# Patient Record
Sex: MALE | Race: WHITE | NOT HISPANIC OR LATINO
[De-identification: names, ages, dates, MRNs, and addresses within clinical notes are randomized per-mention and may not be internally consistent; named-entity substitution may affect disease eponyms.]

---
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
## 2020-10-12 ENCOUNTER — APPOINTMENT (OUTPATIENT)
Dept: PSYCHIATRY | Facility: CLINIC | Age: 20
End: 2020-10-12
Payer: COMMERCIAL

## 2020-10-12 DIAGNOSIS — F39 UNSPECIFIED MOOD [AFFECTIVE] DISORDER: ICD-10-CM

## 2020-10-12 PROCEDURE — 99205 OFFICE O/P NEW HI 60 MIN: CPT

## 2020-10-12 RX ORDER — CLONAZEPAM 0.5 MG/1
0.5 TABLET ORAL DAILY
Qty: 15 | Refills: 0 | Status: ACTIVE | COMMUNITY
Start: 2020-10-12 | End: 1900-01-01

## 2020-11-09 ENCOUNTER — APPOINTMENT (OUTPATIENT)
Dept: PSYCHIATRY | Facility: CLINIC | Age: 20
End: 2020-11-09
Payer: COMMERCIAL

## 2020-11-09 PROCEDURE — 99214 OFFICE O/P EST MOD 30 MIN: CPT

## 2020-11-09 PROCEDURE — 99072 ADDL SUPL MATRL&STAF TM PHE: CPT

## 2020-11-16 ENCOUNTER — APPOINTMENT (OUTPATIENT)
Dept: FAMILY MEDICINE | Facility: CLINIC | Age: 20
End: 2020-11-16
Payer: COMMERCIAL

## 2020-11-16 VITALS
TEMPERATURE: 99.5 F | BODY MASS INDEX: 24.59 KG/M2 | SYSTOLIC BLOOD PRESSURE: 130 MMHG | RESPIRATION RATE: 14 BRPM | WEIGHT: 166 LBS | HEART RATE: 90 BPM | HEIGHT: 69 IN | OXYGEN SATURATION: 98 % | DIASTOLIC BLOOD PRESSURE: 80 MMHG

## 2020-11-16 DIAGNOSIS — M54.2 CERVICALGIA: ICD-10-CM

## 2020-11-16 DIAGNOSIS — Z78.9 OTHER SPECIFIED HEALTH STATUS: ICD-10-CM

## 2020-11-16 DIAGNOSIS — Z23 ENCOUNTER FOR IMMUNIZATION: ICD-10-CM

## 2020-11-16 PROCEDURE — 99202 OFFICE O/P NEW SF 15 MIN: CPT | Mod: 25

## 2020-11-16 PROCEDURE — G0008: CPT

## 2020-11-16 PROCEDURE — 99072 ADDL SUPL MATRL&STAF TM PHE: CPT

## 2020-11-16 PROCEDURE — 90686 IIV4 VACC NO PRSV 0.5 ML IM: CPT

## 2020-12-01 ENCOUNTER — APPOINTMENT (OUTPATIENT)
Dept: PSYCHIATRY | Facility: CLINIC | Age: 20
End: 2020-12-01
Payer: COMMERCIAL

## 2020-12-01 PROCEDURE — 99072 ADDL SUPL MATRL&STAF TM PHE: CPT

## 2020-12-01 PROCEDURE — 99214 OFFICE O/P EST MOD 30 MIN: CPT

## 2020-12-01 RX ORDER — TIZANIDINE 2 MG/1
2 TABLET ORAL
Qty: 14 | Refills: 0 | Status: COMPLETED | COMMUNITY
Start: 2020-11-16 | End: 2020-12-01

## 2020-12-01 RX ORDER — FLUOXETINE HYDROCHLORIDE 10 MG/1
10 CAPSULE ORAL DAILY
Qty: 30 | Refills: 0 | Status: COMPLETED | COMMUNITY
Start: 2020-11-09 | End: 2020-12-01

## 2020-12-11 DIAGNOSIS — Z11.59 ENCOUNTER FOR SCREENING FOR OTHER VIRAL DISEASES: ICD-10-CM

## 2020-12-14 LAB — SARS-COV-2 N GENE NPH QL NAA+PROBE: DETECTED

## 2020-12-21 ENCOUNTER — APPOINTMENT (OUTPATIENT)
Dept: PSYCHIATRY | Facility: CLINIC | Age: 20
End: 2020-12-21

## 2021-01-05 ENCOUNTER — APPOINTMENT (OUTPATIENT)
Dept: PSYCHIATRY | Facility: CLINIC | Age: 21
End: 2021-01-05
Payer: COMMERCIAL

## 2021-01-05 PROCEDURE — 99072 ADDL SUPL MATRL&STAF TM PHE: CPT

## 2021-01-05 PROCEDURE — 99214 OFFICE O/P EST MOD 30 MIN: CPT

## 2021-01-05 RX ORDER — SERTRALINE 25 MG/1
25 TABLET, FILM COATED ORAL
Refills: 0 | Status: COMPLETED | COMMUNITY
End: 2021-01-05

## 2021-01-05 RX ORDER — FLUOXETINE HYDROCHLORIDE 20 MG/1
20 CAPSULE ORAL
Refills: 0 | Status: COMPLETED | COMMUNITY
End: 2021-01-05

## 2021-01-05 RX ORDER — NAPROXEN 500 MG/1
500 TABLET ORAL
Qty: 30 | Refills: 0 | Status: COMPLETED | COMMUNITY
Start: 2020-11-16 | End: 2021-01-05

## 2021-01-05 RX ORDER — CLINDAMYCIN PHOSPHATE 10 MG/ML
1 LOTION TOPICAL
Qty: 60 | Refills: 0 | Status: DISCONTINUED | COMMUNITY
Start: 2020-09-11

## 2021-01-05 RX ORDER — BUPROPION HYDROCHLORIDE 75 MG/1
75 TABLET, FILM COATED ORAL
Qty: 30 | Refills: 0 | Status: DISCONTINUED | COMMUNITY
Start: 2020-09-21

## 2021-01-14 ENCOUNTER — APPOINTMENT (OUTPATIENT)
Dept: PSYCHIATRY | Facility: CLINIC | Age: 21
End: 2021-01-14

## 2021-02-02 ENCOUNTER — APPOINTMENT (OUTPATIENT)
Dept: PSYCHIATRY | Facility: CLINIC | Age: 21
End: 2021-02-02
Payer: COMMERCIAL

## 2021-02-02 PROCEDURE — 99214 OFFICE O/P EST MOD 30 MIN: CPT | Mod: 95

## 2021-02-02 RX ORDER — ESCITALOPRAM OXALATE 5 MG/1
5 TABLET ORAL DAILY
Qty: 30 | Refills: 0 | Status: COMPLETED | COMMUNITY
Start: 2021-01-05 | End: 2021-02-02

## 2021-03-11 ENCOUNTER — NON-APPOINTMENT (OUTPATIENT)
Age: 21
End: 2021-03-11

## 2021-03-16 ENCOUNTER — APPOINTMENT (OUTPATIENT)
Dept: FAMILY MEDICINE | Facility: CLINIC | Age: 21
End: 2021-03-16
Payer: COMMERCIAL

## 2021-03-16 VITALS
DIASTOLIC BLOOD PRESSURE: 64 MMHG | HEIGHT: 70 IN | TEMPERATURE: 97.8 F | BODY MASS INDEX: 24.48 KG/M2 | HEART RATE: 87 BPM | OXYGEN SATURATION: 98 % | SYSTOLIC BLOOD PRESSURE: 116 MMHG | WEIGHT: 171 LBS | RESPIRATION RATE: 18 BRPM

## 2021-03-16 DIAGNOSIS — Z01.818 ENCOUNTER FOR OTHER PREPROCEDURAL EXAMINATION: ICD-10-CM

## 2021-03-16 DIAGNOSIS — Z82.49 FAMILY HISTORY OF ISCHEMIC HEART DISEASE AND OTHER DISEASES OF THE CIRCULATORY SYSTEM: ICD-10-CM

## 2021-03-16 DIAGNOSIS — Z80.8 FAMILY HISTORY OF MALIGNANT NEOPLASM OF OTHER ORGANS OR SYSTEMS: ICD-10-CM

## 2021-03-16 DIAGNOSIS — Z80.3 FAMILY HISTORY OF MALIGNANT NEOPLASM OF BREAST: ICD-10-CM

## 2021-03-16 DIAGNOSIS — Z80.51 FAMILY HISTORY OF MALIGNANT NEOPLASM OF KIDNEY: ICD-10-CM

## 2021-03-16 PROCEDURE — 99214 OFFICE O/P EST MOD 30 MIN: CPT | Mod: 25

## 2021-03-16 PROCEDURE — 36415 COLL VENOUS BLD VENIPUNCTURE: CPT

## 2021-03-16 PROCEDURE — 99072 ADDL SUPL MATRL&STAF TM PHE: CPT

## 2021-03-17 LAB
ALBUMIN SERPL ELPH-MCNC: 5 G/DL
ALP BLD-CCNC: 75 U/L
ALT SERPL-CCNC: 30 U/L
ANION GAP SERPL CALC-SCNC: 12 MMOL/L
AST SERPL-CCNC: 28 U/L
BASOPHILS # BLD AUTO: 0.05 K/UL
BASOPHILS NFR BLD AUTO: 0.7 %
BILIRUB SERPL-MCNC: 0.2 MG/DL
BUN SERPL-MCNC: 14 MG/DL
CALCIUM SERPL-MCNC: 9.9 MG/DL
CHLORIDE SERPL-SCNC: 101 MMOL/L
CO2 SERPL-SCNC: 26 MMOL/L
CREAT SERPL-MCNC: 0.96 MG/DL
EOSINOPHIL # BLD AUTO: 0.18 K/UL
EOSINOPHIL NFR BLD AUTO: 2.5 %
GLUCOSE SERPL-MCNC: 90 MG/DL
HCT VFR BLD CALC: 39.9 %
HGB BLD-MCNC: 13.6 G/DL
IMM GRANULOCYTES NFR BLD AUTO: 0.4 %
INR PPP: 0.99 RATIO
LYMPHOCYTES # BLD AUTO: 1.98 K/UL
LYMPHOCYTES NFR BLD AUTO: 28 %
MAN DIFF?: NORMAL
MCHC RBC-ENTMCNC: 29.5 PG
MCHC RBC-ENTMCNC: 34.1 GM/DL
MCV RBC AUTO: 86.6 FL
MONOCYTES # BLD AUTO: 0.58 K/UL
MONOCYTES NFR BLD AUTO: 8.2 %
NEUTROPHILS # BLD AUTO: 4.24 K/UL
NEUTROPHILS NFR BLD AUTO: 60.2 %
PLATELET # BLD AUTO: 351 K/UL
POTASSIUM SERPL-SCNC: 4.8 MMOL/L
PROT SERPL-MCNC: 6.9 G/DL
PT BLD: 11.8 SEC
RBC # BLD: 4.61 M/UL
RBC # FLD: 12.2 %
SODIUM SERPL-SCNC: 138 MMOL/L
WBC # FLD AUTO: 7.06 K/UL

## 2021-03-18 NOTE — PHYSICAL EXAM
[Normal TMs] : both tympanic membranes were normal [Normal Supraclavicular Nodes] : no supraclavicular lymphadenopathy [Coordination Grossly Intact] : coordination grossly intact [No Focal Deficits] : no focal deficits [Normal Gait] : normal gait [Speech Grossly Normal] : speech grossly normal [Alert and Oriented x3] : oriented to person, place, and time [Normal Mood] : the mood was normal [Normal] : affect was normal and insight and judgment were intact

## 2021-03-20 ENCOUNTER — APPOINTMENT (OUTPATIENT)
Dept: RADIOLOGY | Facility: CLINIC | Age: 21
End: 2021-03-20
Payer: COMMERCIAL

## 2021-03-20 ENCOUNTER — OUTPATIENT (OUTPATIENT)
Dept: OUTPATIENT SERVICES | Facility: HOSPITAL | Age: 21
LOS: 1 days | End: 2021-03-20
Payer: COMMERCIAL

## 2021-03-20 DIAGNOSIS — Z01.818 ENCOUNTER FOR OTHER PREPROCEDURAL EXAMINATION: ICD-10-CM

## 2021-03-20 PROCEDURE — 71046 X-RAY EXAM CHEST 2 VIEWS: CPT | Mod: 26

## 2021-03-20 PROCEDURE — 71046 X-RAY EXAM CHEST 2 VIEWS: CPT

## 2021-03-23 PROBLEM — Z01.818 PRE-OPERATIVE CLEARANCE: Status: ACTIVE | Noted: 2021-03-16

## 2021-03-23 NOTE — ASSESSMENT
[Other: _____] : [unfilled] [Patient Optimized for Surgery] : Patient optimized for surgery [FreeTextEntry4] : Patient optimized for surgery\par - Pending COVID pcr nasal swab [FreeTextEntry2] : Patient instructed to d/c all OTC medications/supplements one week prior to surgery

## 2021-03-23 NOTE — HISTORY OF PRESENT ILLNESS
[No Pertinent Cardiac History] : no history of aortic stenosis, atrial fibrillation, coronary artery disease, recent myocardial infarction, or implantable device/pacemaker [Smoker] : smoker [No Adverse Anesthesia Reaction] : no adverse anesthesia reaction in self or family member [(Patient denies any chest pain, claudication, dyspnea on exertion, orthopnea, palpitations or syncope)] : Patient denies any chest pain, claudication, dyspnea on exertion, orthopnea, palpitations or syncope [Asthma] : no asthma [COPD] : no COPD [Sleep Apnea] : no sleep apnea [Chronic Anticoagulation] : no chronic anticoagulation [Chronic Kidney Disease] : no chronic kidney disease [Diabetes] : no diabetes [FreeTextEntry1] :  nasal reconstruction/cosmetic rhinoplasty [FreeTextEntry2] : 03/25/21 [FreeTextEntry3] : MD Jose Thomas [FreeTextEntry4] : 21 yo male presents to the office for pre operative clearance for nasal reconstruction/cosmetic rhinoplasty.

## 2021-05-07 ENCOUNTER — APPOINTMENT (OUTPATIENT)
Dept: PSYCHIATRY | Facility: CLINIC | Age: 21
End: 2021-05-07
Payer: COMMERCIAL

## 2021-05-07 PROCEDURE — 99072 ADDL SUPL MATRL&STAF TM PHE: CPT

## 2021-05-07 PROCEDURE — 99214 OFFICE O/P EST MOD 30 MIN: CPT

## 2021-06-08 ENCOUNTER — APPOINTMENT (OUTPATIENT)
Dept: PSYCHIATRY | Facility: CLINIC | Age: 21
End: 2021-06-08

## 2021-07-02 ENCOUNTER — APPOINTMENT (OUTPATIENT)
Dept: DERMATOLOGY | Facility: CLINIC | Age: 21
End: 2021-07-02

## 2021-09-21 ENCOUNTER — APPOINTMENT (OUTPATIENT)
Dept: PSYCHIATRY | Facility: CLINIC | Age: 21
End: 2021-09-21

## 2021-09-22 ENCOUNTER — APPOINTMENT (OUTPATIENT)
Dept: PSYCHIATRY | Facility: CLINIC | Age: 21
End: 2021-09-22
Payer: COMMERCIAL

## 2021-09-22 PROCEDURE — 99214 OFFICE O/P EST MOD 30 MIN: CPT

## 2021-10-12 ENCOUNTER — APPOINTMENT (OUTPATIENT)
Dept: PAIN MANAGEMENT | Facility: CLINIC | Age: 21
End: 2021-10-12

## 2021-10-20 ENCOUNTER — APPOINTMENT (OUTPATIENT)
Age: 21
End: 2021-10-20

## 2021-11-02 ENCOUNTER — APPOINTMENT (OUTPATIENT)
Dept: PSYCHIATRY | Facility: CLINIC | Age: 21
End: 2021-11-02

## 2021-12-06 ENCOUNTER — APPOINTMENT (OUTPATIENT)
Dept: PSYCHIATRY | Facility: CLINIC | Age: 21
End: 2021-12-06
Payer: COMMERCIAL

## 2021-12-06 PROCEDURE — 99214 OFFICE O/P EST MOD 30 MIN: CPT

## 2021-12-08 LAB
25(OH)D3 SERPL-MCNC: 35 NG/ML
ALBUMIN SERPL ELPH-MCNC: 5.1 G/DL
ALP BLD-CCNC: 69 U/L
ALT SERPL-CCNC: 13 U/L
ANION GAP SERPL CALC-SCNC: 11 MMOL/L
AST SERPL-CCNC: 18 U/L
BASOPHILS # BLD AUTO: 0.03 K/UL
BASOPHILS NFR BLD AUTO: 0.5 %
BILIRUB SERPL-MCNC: 0.3 MG/DL
BUN SERPL-MCNC: 12 MG/DL
CALCIUM SERPL-MCNC: 10.1 MG/DL
CHLORIDE SERPL-SCNC: 102 MMOL/L
CHOLEST SERPL-MCNC: 163 MG/DL
CO2 SERPL-SCNC: 27 MMOL/L
CREAT SERPL-MCNC: 1.07 MG/DL
EOSINOPHIL # BLD AUTO: 0.08 K/UL
EOSINOPHIL NFR BLD AUTO: 1.4 %
ESTIMATED AVERAGE GLUCOSE: 108 MG/DL
GLUCOSE SERPL-MCNC: 95 MG/DL
HBA1C MFR BLD HPLC: 5.4 %
HCT VFR BLD CALC: 39.1 %
HDLC SERPL-MCNC: 49 MG/DL
HGB BLD-MCNC: 13.1 G/DL
IMM GRANULOCYTES NFR BLD AUTO: 0.2 %
LDLC SERPL CALC-MCNC: 100 MG/DL
LYMPHOCYTES # BLD AUTO: 1.55 K/UL
LYMPHOCYTES NFR BLD AUTO: 27.7 %
MAN DIFF?: NORMAL
MCHC RBC-ENTMCNC: 29.6 PG
MCHC RBC-ENTMCNC: 33.5 GM/DL
MCV RBC AUTO: 88.3 FL
MONOCYTES # BLD AUTO: 0.5 K/UL
MONOCYTES NFR BLD AUTO: 8.9 %
NEUTROPHILS # BLD AUTO: 3.43 K/UL
NEUTROPHILS NFR BLD AUTO: 61.3 %
NONHDLC SERPL-MCNC: 114 MG/DL
PLATELET # BLD AUTO: 318 K/UL
POTASSIUM SERPL-SCNC: 4.6 MMOL/L
PROT SERPL-MCNC: 7.3 G/DL
RBC # BLD: 4.43 M/UL
RBC # FLD: 12.2 %
SODIUM SERPL-SCNC: 141 MMOL/L
TRIGL SERPL-MCNC: 73 MG/DL
TSH SERPL-ACNC: 1.39 UIU/ML
WBC # FLD AUTO: 5.6 K/UL

## 2022-01-03 ENCOUNTER — APPOINTMENT (OUTPATIENT)
Dept: PSYCHIATRY | Facility: CLINIC | Age: 22
End: 2022-01-03
Payer: COMMERCIAL

## 2022-01-03 PROCEDURE — 99214 OFFICE O/P EST MOD 30 MIN: CPT

## 2022-01-03 RX ORDER — BUPROPION HYDROCHLORIDE 75 MG/1
75 TABLET, FILM COATED ORAL
Qty: 30 | Refills: 0 | Status: COMPLETED | COMMUNITY
Start: 2021-05-07 | End: 2022-01-03

## 2022-02-11 ENCOUNTER — APPOINTMENT (OUTPATIENT)
Dept: PSYCHIATRY | Facility: CLINIC | Age: 22
End: 2022-02-11
Payer: COMMERCIAL

## 2022-02-11 PROCEDURE — 99214 OFFICE O/P EST MOD 30 MIN: CPT

## 2022-03-11 ENCOUNTER — APPOINTMENT (OUTPATIENT)
Dept: PAIN MANAGEMENT | Facility: CLINIC | Age: 22
End: 2022-03-11

## 2022-03-14 ENCOUNTER — APPOINTMENT (OUTPATIENT)
Dept: PSYCHIATRY | Facility: CLINIC | Age: 22
End: 2022-03-14
Payer: COMMERCIAL

## 2022-03-14 PROCEDURE — 99214 OFFICE O/P EST MOD 30 MIN: CPT

## 2022-03-14 RX ORDER — BUPROPION HYDROCHLORIDE 150 MG/1
150 TABLET, EXTENDED RELEASE ORAL
Qty: 4 | Refills: 0 | Status: DISCONTINUED | COMMUNITY
Start: 2022-02-07

## 2022-04-11 PROBLEM — Z11.59 SCREENING FOR VIRAL DISEASE: Status: ACTIVE | Noted: 2020-12-11

## 2022-06-13 ENCOUNTER — APPOINTMENT (OUTPATIENT)
Dept: PSYCHIATRY | Facility: CLINIC | Age: 22
End: 2022-06-13
Payer: COMMERCIAL

## 2022-06-13 PROCEDURE — 99214 OFFICE O/P EST MOD 30 MIN: CPT

## 2022-08-25 ENCOUNTER — NON-APPOINTMENT (OUTPATIENT)
Age: 22
End: 2022-08-25

## 2022-08-26 ENCOUNTER — APPOINTMENT (OUTPATIENT)
Dept: FAMILY MEDICINE | Facility: CLINIC | Age: 22
End: 2022-08-26

## 2022-08-26 VITALS
DIASTOLIC BLOOD PRESSURE: 70 MMHG | HEIGHT: 70 IN | SYSTOLIC BLOOD PRESSURE: 122 MMHG | HEART RATE: 61 BPM | TEMPERATURE: 98.1 F | RESPIRATION RATE: 16 BRPM | WEIGHT: 176 LBS | OXYGEN SATURATION: 98 % | BODY MASS INDEX: 25.2 KG/M2

## 2022-08-26 DIAGNOSIS — Z00.00 ENCOUNTER FOR GENERAL ADULT MEDICAL EXAMINATION W/OUT ABNORMAL FINDINGS: ICD-10-CM

## 2022-08-26 PROCEDURE — 99395 PREV VISIT EST AGE 18-39: CPT | Mod: 25

## 2022-08-26 PROCEDURE — G0444 DEPRESSION SCREEN ANNUAL: CPT | Mod: 59

## 2022-08-30 PROBLEM — Z00.00 ENCOUNTER FOR PREVENTIVE HEALTH EXAMINATION: Status: ACTIVE | Noted: 2022-08-26

## 2022-08-30 NOTE — HEALTH RISK ASSESSMENT
[Very Good] : ~his/her~  mood as very good [Current] : Current [5-9] : 5-9 [2 - 4 times a month (2 pts)] : 2-4 times a month (2 points) [No] : In the past 12 months have you used drugs other than those required for medical reasons? No [With Family] : lives with family [# of Members in Household ___] :  household currently consist of [unfilled] member(s) [Student] : student [College] : College [Single] : single [Sexually Active] : sexually active [Feels Safe at Home] : Feels safe at home [Fully functional (bathing, dressing, toileting, transferring, walking, feeding)] : Fully functional (bathing, dressing, toileting, transferring, walking, feeding) [Fully functional (using the telephone, shopping, preparing meals, housekeeping, doing laundry, using] : Fully functional and needs no help or supervision to perform IADLs (using the telephone, shopping, preparing meals, housekeeping, doing laundry, using transportation, managing medications and managing finances) [Yes] : Yes [No falls in past year] : Patient reported no falls in the past year [0] : 2) Feeling down, depressed, or hopeless: Not at all (0) [PHQ-2 Negative - No further assessment needed] : PHQ-2 Negative - No further assessment needed [Audit-CScore] : 2 [de-identified] : active [de-identified] : tries to be healthy [[id number]] : 0 [High Risk Behavior] : no high risk behavior [Reports changes in hearing] : Reports no changes in hearing [Reports changes in vision] : Reports no changes in vision [de-identified] : st [facility]

## 2022-08-30 NOTE — REVIEW OF SYSTEMS
Add chlorthalidone 12.5 mg daily.  
Advised contact his cardiologist about whether his echo should be done now.  
Consider trial of newer statin.  
Has to Stop rosuvastatin intermittently due to myalgias.  On 5 mg daily.  Check FLP.  
See \"hyperlipidemia\"  
[Negative] : Heme/Lymph

## 2022-08-30 NOTE — HISTORY OF PRESENT ILLNESS
[FreeTextEntry1] : 21 yo male with PMHx anxiety/depression (bupropion, escitalopram) insomnia (seroquel) presents to the office for a physical.  [de-identified] : the patient reports feeling well, states having regular f/u appointments with psychiatrist, feels well on current medications. is currently attending [employer] for undergraduate degree in computer science.

## 2022-08-30 NOTE — PHYSICAL EXAM
[Normal Rate] : normal rate  [Regular Rhythm] : with a regular rhythm [Normal S1, S2] : normal S1 and S2 [No Rash] : no rash [Coordination Grossly Intact] : coordination grossly intact [No Focal Deficits] : no focal deficits [Normal Gait] : normal gait [Speech Grossly Normal] : speech grossly normal [Alert and Oriented x3] : oriented to person, place, and time [Normal Mood] : the mood was normal [Normal] : affect was normal and insight and judgment were intact

## 2022-08-30 NOTE — END OF VISIT
[FreeTextEntry3] : I, Dr. Sheldon, personally performed the evaluation and management (E/M) services for this established patient who presents today with (a) new problem(s)/exacerbation of (an) existing condition(s). That E/M includes conducting the examination, assessing all new/exacerbated conditions, and establishing a new plan of care. Today, my nurse practitioner, Sadia Macias, was here to observe my evaluation and management services fort his new problem/exacerbated condition to be follow going forward.\par

## 2022-09-02 ENCOUNTER — MED ADMIN CHARGE (OUTPATIENT)
Age: 22
End: 2022-09-02

## 2022-09-02 ENCOUNTER — APPOINTMENT (OUTPATIENT)
Dept: FAMILY MEDICINE | Facility: CLINIC | Age: 22
End: 2022-09-02

## 2022-09-02 DIAGNOSIS — Z23 ENCOUNTER FOR IMMUNIZATION: ICD-10-CM

## 2022-09-02 PROCEDURE — 90471 IMMUNIZATION ADMIN: CPT

## 2022-09-02 PROCEDURE — 90715 TDAP VACCINE 7 YRS/> IM: CPT

## 2022-09-07 ENCOUNTER — APPOINTMENT (OUTPATIENT)
Dept: PSYCHIATRY | Facility: CLINIC | Age: 22
End: 2022-09-07

## 2022-09-14 ENCOUNTER — NON-APPOINTMENT (OUTPATIENT)
Age: 22
End: 2022-09-14

## 2022-11-09 ENCOUNTER — APPOINTMENT (OUTPATIENT)
Dept: PSYCHIATRY | Facility: CLINIC | Age: 22
End: 2022-11-09

## 2022-11-09 PROCEDURE — 99214 OFFICE O/P EST MOD 30 MIN: CPT

## 2022-11-09 NOTE — CURRENT PSYCHIATRIC SYMPTOMS
[Depressed Mood] : depressed mood [Anhedonia] : anhedonia [Guilt] : feeling guilty [Decreased Concentration] : decreased concentrating ability [Insomnia] : insomnia [Psychomotor Agitation] : psychomotor agitation [Anorexia] : anorexia [Highly Irritable] : high irritability [Distractibility] : distractibility [Excessive Worry] : excessive worry [Ruminations] : ruminations [Hypochondriasis] : hypochondriasis [Panic] : panic  [de-identified] : denies [de-identified] : denies [de-identified] : denies [de-identified] : denies

## 2022-11-09 NOTE — HISTORY OF PRESENT ILLNESS
[de-identified] : Patient is here in the office for face to face interview with writer for 3 month followup visit.\par \par Writer has not seen the patient since June 2022. No medication changes were made at that time. Patients states he is in school. Doing well. States he likes the medication. Less depression and anxiety. Sleeping from midnight till 10 or 11 am with the Seroquel 25 mg. "I can't sleep without it."  Appetite is good. Has not gained or lost. Energy, concentration and motivation are all improved. Denies any AVH, SI or HI. Mother is a PA. Last drink was over the weekend where he had 2 drinks. No Klonopin this month.  [de-identified] : Patient is here for in the office for face to face interview for initial psychiatric evaluation. \par \par ID: Patient is a 19 yo  male, ashlee at [employer], unemployed, domicile living with father and siblings seen today for psychiatric evaluation and medication management.\par \par HPI: Patient is accompanied by his father. States he has been feeling depressed and anxious for 1.5 mths. Stressors contributing to his depression and anxiety is that he felt out of place at his college e so and then came back home. States he suffers from social anxiety. “Increase social anxiety, I feel like life isn’t real; mind racing all the time; I don’t feel like myself. Mind is blank in social situations.” \par \par Depression: has low mood and anhedonia every day. \par \par Anxiety: Endorses to anxiety in the form of worrying, rumination, panic attack: (last attack was 3 days ago; “My heart races, I sweat. It occurs 3-4 times per week).” Somatization (diarrhea, fatigue, and diarrhea, clenching of jaw, headache). \par \par Sleep: is decreased. States he sleeps at 3 am as his “mind will not shut off.” \par \par Appetite, energy, concentration and motivation are all decreased. Patient denies any AVH, Hl. States he has some passive SI but denies any plan. \par \par Hypomanic symptoms: irritability, distractibility. \par \par Substance use hx:\par Juul: 1 cartridge lasts him 1 week\par Alcohol: started at 14. Highest amt drinking 8-9 twelve oz beers. Last drink was last week ½ beer with father during basketball game. +REKHA, denies any DWI, or W/D symptoms from alcohol. \par Marijuana: started at 17 gave him increase anxiety and paranoia. Last use was 2 years ago. \par Caffeine: drinks 1 cup of coffee per day\par

## 2022-11-09 NOTE — PHYSICAL EXAM
[None] : none [Anxious] : anxious [Constricted] : constricted [Normal] : good [FreeTextEntry8] : "I'm depressed."

## 2022-11-09 NOTE — SOCIAL HISTORY
[With Family] : lives with family [Unemployed] : unemployed [Never ] : never  [High School] : high school [None] : none [FreeTextEntry4] : [employer] in college right now [FreeTextEntry1] : Born: Born in [place] \par Siblings: 3 siblings (1 sister 22 and 2 brothers 18 and 15)\par Parents:  when patient was 8 years old.  \par Education: [employer] in [employer]. Initially went to [employer] for college in Freshman year then [employer] for sophomore year and has been to [employer] for 1 week. Dropped 2 classes. Did not specify why changing schools or why dropped his classes. States it was related to anxiety. \par Occupation: unemployed \par /Kids single\par Abuse Denies\par Legal: Denies\par

## 2022-11-09 NOTE — FAMILY HISTORY
[FreeTextEntry1] : Mother: Bipolar\par \par Mother's side (maternal grandmother and maternal sister): MDD with psychosis; and Substance use\par \par Paternal nephew: unknown mental illness; used substance\par \par \par

## 2022-11-09 NOTE — REASON FOR VISIT
[Evaluation] : evaluation of [Parent] : parent [FreeTextEntry1] : anxiety, depression, and insomnia, and mind racing

## 2022-11-09 NOTE — DISCUSSION/SUMMARY
[FreeTextEntry1] : Assessment: Patient is a 21 yo  male with h/o anxiety, depression, mood disorder, insomnia, and panic attacks seen today for medication management. Patient is compliant with her medication, tolerating them well without any side effects. I-stop reviewed and no issues noticed.\par \par I-STOP:\par 10/12/2020 10/12/2020 clonazepam 0.5 mg tablet  15 15 Shashank Scott MD \par \par PLAN:\par Told patient to make appt to see Yg as he needs a therapist. \par Continue Wellbutrin  mg PO QAM for depression, low motivation, energy, concentration and decrease SSRI induced sexual dysfunction.\par Continue Lexapro 20 mg. PO QAM for depression and anxiety.   \par Continue Seroquel 25 mg PO QHS for mood stability, insomnia and increase appetite.  \par Continue Klonopin 0.5 mg PO PRN for panic attacks. # 10/15. no refills given\par - Discussed risks and benefits of medications including side effects of GI and sexual side effects with SSRI, and Metabolic Syndrome with Seroquel. Alternative strategies including no intervention discussed with patient. Patient consents to current medications as prescribed.\par - Discussed with patient regarding importance of abstinence and sobriety from alcohol and drugs. Educated about relationship between worsening mood/anxiety symptoms and drug use and improvement of symptoms with abstinence. \par - Discussed about unpredictable effects including cardiorespiratory collapse from the combination of illicit drugs and prescribed medications. Patient verbalized understanding.\par - Patient understands to contact clinic prn with concerns and agrees to call 911 or go to nearest ER if symptoms worsen.\par - Next appointment was made in 3 month. Patient left the office without any distress.\par

## 2023-02-06 ENCOUNTER — APPOINTMENT (OUTPATIENT)
Dept: PSYCHIATRY | Facility: CLINIC | Age: 23
End: 2023-02-06

## 2023-03-03 ENCOUNTER — APPOINTMENT (OUTPATIENT)
Dept: PSYCHIATRY | Facility: CLINIC | Age: 23
End: 2023-03-03
Payer: COMMERCIAL

## 2023-03-03 PROCEDURE — 99214 OFFICE O/P EST MOD 30 MIN: CPT

## 2023-03-03 NOTE — DISCUSSION/SUMMARY
[FreeTextEntry1] : Assessment: Patient is a 21 yo  male with h/o anxiety, depression, mood disorder, insomnia, and panic attacks seen today for medication management. Patient is compliant with her medication, tolerating them well without any side effects. I-stop reviewed and no issues noticed.\par \par I-STOP:\par 10/12/2020 10/12/2020 clonazepam 0.5 mg tablet  15 15 Shashank Scott MD \par \par PLAN:\par Told patient to make appt to see Yg as he needs a therapist. States he is not ready yet for therapist. \par Continue Wellbutrin  mg PO QAM for depression, low motivation, energy, concentration and decrease SSRI induced sexual dysfunction.\par Continue Lexapro 20 mg. PO QAM for depression and anxiety.   \par Continue Seroquel 25 mg PO QHS for mood stability, insomnia and increase appetite.  \par Continue Klonopin 0.5 mg PO PRN for panic attacks. # 10/15. no refills given\par - Discussed risks and benefits of medications including side effects of GI and sexual side effects with SSRI, and Metabolic Syndrome with Seroquel. Alternative strategies including no intervention discussed with patient. Patient consents to current medications as prescribed.\par - Discussed with patient regarding importance of abstinence and sobriety from alcohol and drugs. Educated about relationship between worsening mood/anxiety symptoms and drug use and improvement of symptoms with abstinence. \par - Discussed about unpredictable effects including cardiorespiratory collapse from the combination of illicit drugs and prescribed medications. Patient verbalized understanding.\par - Patient understands to contact clinic prn with concerns and agrees to call 911 or go to nearest ER if symptoms worsen.\par - Next appointment was made in 3 month. Patient left the office without any distress.\par

## 2023-03-03 NOTE — SOCIAL HISTORY
[With Family] : lives with family [Unemployed] : unemployed [Never ] : never  [High School] : high school [None] : none [FreeTextEntry4] : [employer] in college right now [FreeTextEntry1] : Born: Born in [place] \par Siblings: 3 siblings (1 sister 22 and 2 brothers 18 and 15)\par Parents:  when patient was 8 years old.  \par Education: [employer] in [employer]. Initially went to [employer] for college in Freshman year then [employer] for sophomore year and has been to [employer] for 1 week. Dropped 2 classes. Did not specify why changing schools or why dropped his classes. States it was related to anxiety. \par Occupation: unemployed \par /Kids single\par Abuse Denies\par Legal: Denies\par

## 2023-03-03 NOTE — HISTORY OF PRESENT ILLNESS
[de-identified] : Patient is here in the office for face to face interview with writer for 3 month followup visit.\par \par Writer has not seen the patient since Sept 2022. No medication changes were made at that time. Patients states he is in school. In his 3rd year. Currently on brerak. Doing well. States he likes the medication. Less depression and anxiety. Sleeping from midnight till 10 or 11 am with the Seroquel 25 mg. "I can't sleep without it."  Appetite is good. Has not gained or lost. Energy, concentration and motivation are all improved. Denies any AVH, SI or HI. Mother is a PA. Last drink was 2 night ago where he had 3 drinks. No Klonopin this month.

## 2023-04-23 ENCOUNTER — NON-APPOINTMENT (OUTPATIENT)
Age: 23
End: 2023-04-23

## 2023-06-05 ENCOUNTER — APPOINTMENT (OUTPATIENT)
Dept: PSYCHIATRY | Facility: CLINIC | Age: 23
End: 2023-06-05

## 2023-10-05 RX ORDER — QUETIAPINE FUMARATE 25 MG/1
25 TABLET ORAL
Qty: 30 | Refills: 0 | Status: ACTIVE | COMMUNITY
Start: 2020-10-12 | End: 1900-01-01

## 2023-10-18 ENCOUNTER — APPOINTMENT (OUTPATIENT)
Dept: PSYCHIATRY | Facility: CLINIC | Age: 23
End: 2023-10-18

## 2023-10-18 RX ORDER — ESCITALOPRAM OXALATE 20 MG/1
20 TABLET ORAL DAILY
Qty: 30 | Refills: 0 | Status: ACTIVE | COMMUNITY
Start: 2020-12-01 | End: 1900-01-01

## 2023-10-18 RX ORDER — BUPROPION HYDROCHLORIDE 300 MG/1
300 TABLET, EXTENDED RELEASE ORAL DAILY
Qty: 30 | Refills: 0 | Status: ACTIVE | COMMUNITY
Start: 2022-01-03 | End: 1900-01-01

## 2023-12-19 ENCOUNTER — APPOINTMENT (OUTPATIENT)
Dept: FAMILY MEDICINE | Facility: CLINIC | Age: 23
End: 2023-12-19
Payer: COMMERCIAL

## 2023-12-19 VITALS
DIASTOLIC BLOOD PRESSURE: 80 MMHG | HEART RATE: 77 BPM | RESPIRATION RATE: 16 BRPM | SYSTOLIC BLOOD PRESSURE: 130 MMHG | HEIGHT: 70 IN | BODY MASS INDEX: 25.77 KG/M2 | TEMPERATURE: 98.4 F | WEIGHT: 180 LBS | OXYGEN SATURATION: 97 %

## 2023-12-19 DIAGNOSIS — F32.A DEPRESSION, UNSPECIFIED: ICD-10-CM

## 2023-12-19 DIAGNOSIS — F41.9 ANXIETY DISORDER, UNSPECIFIED: ICD-10-CM

## 2023-12-19 DIAGNOSIS — J06.9 ACUTE UPPER RESPIRATORY INFECTION, UNSPECIFIED: ICD-10-CM

## 2023-12-19 PROCEDURE — 99214 OFFICE O/P EST MOD 30 MIN: CPT

## 2023-12-19 RX ORDER — BENZONATATE 100 MG/1
100 CAPSULE ORAL
Qty: 30 | Refills: 3 | Status: ACTIVE | COMMUNITY
Start: 2023-12-19 | End: 1900-01-01

## 2023-12-19 NOTE — REVIEW OF SYSTEMS
[Fever] : no fever [Chills] : chills [Fatigue] : fatigue [Postnasal Drip] : postnasal drip [Nasal Discharge] : nasal discharge [Sore Throat] : sore throat [Cough] : cough [Dyspnea on Exertion] : dyspnea on exertion [Negative] : Cardiovascular

## 2023-12-19 NOTE — PHYSICAL EXAM
[Normal] : affect was normal and insight and judgment were intact [de-identified] : mucus in throat

## 2023-12-19 NOTE — HISTORY OF PRESENT ILLNESS
[FreeTextEntry8] : 22 y/o M PMHx anxiety /depression (Lexapro, Wellbutrin, Seroquel). presents to office with 8 day hx of St now resolving. Now coughing, body aches and SOBOE. Didnt take Covid test. Brother has same issue. Body aches and fatigue.

## 2024-01-30 LAB
INFLUENZA A RESULT: NOT DETECTED
INFLUENZA B RESULT: NOT DETECTED
RESP SYN VIRUS RESULT: NOT DETECTED
SARS-COV-2 RESULT: NOT DETECTED

## 2025-06-09 ENCOUNTER — APPOINTMENT (OUTPATIENT)
Dept: MRI IMAGING | Facility: CLINIC | Age: 25
End: 2025-06-09